# Patient Record
Sex: FEMALE | Race: WHITE
[De-identification: names, ages, dates, MRNs, and addresses within clinical notes are randomized per-mention and may not be internally consistent; named-entity substitution may affect disease eponyms.]

---

## 2017-06-06 ENCOUNTER — HOSPITAL ENCOUNTER (OUTPATIENT)
Dept: HOSPITAL 45 - C.RAD1850 | Age: 52
Discharge: HOME | End: 2017-06-06
Attending: INTERNAL MEDICINE
Payer: COMMERCIAL

## 2017-06-06 DIAGNOSIS — Z79.899: ICD-10-CM

## 2017-06-06 DIAGNOSIS — M45.9: ICD-10-CM

## 2017-06-06 DIAGNOSIS — K21.9: Primary | ICD-10-CM

## 2017-06-06 DIAGNOSIS — M06.9: ICD-10-CM

## 2017-06-06 LAB
ALP SERPL-CCNC: 54 U/L (ref 45–117)
ALT SERPL-CCNC: 35 U/L (ref 12–78)
AST SERPL-CCNC: 20 U/L (ref 15–37)
BASOPHILS # BLD: 0.03 K/UL (ref 0–0.2)
BASOPHILS NFR BLD: 0.4 %
COMPLETE: YES
CREAT SERPL-MCNC: 0.98 MG/DL (ref 0.6–1.2)
EOSINOPHIL NFR BLD AUTO: 365 K/UL (ref 130–400)
HCT VFR BLD CALC: 42.4 % (ref 37–47)
IG%: 0.3 %
IMM GRANULOCYTES NFR BLD AUTO: 27 %
LYMPHOCYTES # BLD: 2.02 K/UL (ref 1.2–3.4)
MCH RBC QN AUTO: 29.8 PG (ref 25–34)
MCHC RBC AUTO-ENTMCNC: 32.8 G/DL (ref 32–36)
MCV RBC AUTO: 91 FL (ref 80–100)
MONOCYTES NFR BLD: 6.4 %
NEUTROPHILS # BLD AUTO: 2.4 %
NEUTROPHILS NFR BLD AUTO: 63.5 %
PMV BLD AUTO: 9.4 FL (ref 7.4–10.4)
RBC # BLD AUTO: 4.66 M/UL (ref 4.2–5.4)
RHEUMATOID FACT FLD-ACNC: 106 U/ML (ref 0–15)
WBC # BLD AUTO: 7.48 K/UL (ref 4.8–10.8)

## 2017-06-06 NOTE — DIAGNOSTIC IMAGING REPORT
LEFT HAND MIN 3 VIEWS ROUTINE



CLINICAL HISTORY: M06.9 Rheumatoid [id number] Ankylosing [id number]

Hi    



COMPARISON: None.



DISCUSSION: Mild degenerative change of the interphalangeal as well as

metacarpal phalangeal joints throughout the hand. Mild periarticular osteopenia.

Several subtle marginal erosions. This is most prominent at the level of the

distal second metacarpal. There is no evidence for soft tissue swelling.



IMPRESSION: Findings consistent with early and/or developing rheumatoid change.







Electronically signed by:  Blake Eugene M.D.

6/6/2017 10:08 AM



Dictated Date/Time:  6/6/2017 10:06 AM

## 2017-06-06 NOTE — DIAGNOSTIC IMAGING REPORT
LEFT HAND MIN 3 VIEWS ROUTINE



CLINICAL HISTORY: Rheumatoid arthritis. Ankylosing spondylitis. Left hand pain. 

   



COMPARISON: None.



DISCUSSION: No acute fractures are visualized. There are mild osteoarthritic

type changes most pronounced at the level of the proximal interphalangeal joint

of the index finger, and first metacarpal phalangeal joint. There is equivocal

subchondral cyst within the distal radius.    



IMPRESSION: 

1. No acute fractures

2. Mild degenerative changes

3. No evidence of erosive disease







Electronically signed by:  Lio Francisco M.D.

6/6/2017 10:09 AM



Dictated Date/Time:  6/6/2017 10:07 AM

## 2017-07-11 ENCOUNTER — HOSPITAL ENCOUNTER (OUTPATIENT)
Dept: HOSPITAL 45 - C.RAD1850 | Age: 52
Discharge: HOME | End: 2017-07-11
Attending: INTERNAL MEDICINE
Payer: COMMERCIAL

## 2017-07-11 DIAGNOSIS — M25.461: ICD-10-CM
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
## 2017-07-11 DIAGNOSIS — E55.9: Primary | ICD-10-CM

## 2017-07-11 DIAGNOSIS — Z79.899: ICD-10-CM

## 2017-07-11 DIAGNOSIS — M06.9: ICD-10-CM

## 2017-07-11 DIAGNOSIS — M25.561: ICD-10-CM

## 2017-07-11 DIAGNOSIS — S62.101A: ICD-10-CM

## 2017-07-11 NOTE — DIAGNOSTIC IMAGING REPORT
RIGHT KNEE 3 VIEWS



HISTORY:52 [id number] Rheumatoid uvzlqnsjoK34.899 High risk medication

useM25.56

Right 



COMPARISON: None available.



TECHNIQUE: Frontal, lateral and sunrise views of the right knee.



FINDINGS: 

There is a moderate-sized joint effusion. There is only minimal tricompartmental

marginal spurring without erosive changes identified. No acute fracture or

dislocation. Negative for radiopaque foreign body.



IMPRESSION: 

1. Moderate sized joint effusion.

2. Minimal tricompartmental marginal spurring without evidence of erosive

arthropathy. 







The above report was generated using voice recognition software. It may contain

grammatical, syntax or spelling errors.







Electronically signed by:  Claudio Flores

7/11/2017 10:57 AM



Dictated Date/Time:  7/11/2017 10:55 AM

## 2017-07-18 ENCOUNTER — HOSPITAL ENCOUNTER (OUTPATIENT)
Dept: HOSPITAL 45 - C.MAMM | Age: 52
Discharge: HOME | End: 2017-07-18
Attending: INTERNAL MEDICINE
Payer: COMMERCIAL

## 2017-07-18 DIAGNOSIS — Z79.899: Primary | ICD-10-CM
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
## 2017-07-18 DIAGNOSIS — S62.101A: ICD-10-CM

## 2017-07-18 DIAGNOSIS — M06.9: ICD-10-CM

## 2017-07-18 DIAGNOSIS — M25.561: ICD-10-CM

## 2017-07-18 DIAGNOSIS — S62.609A: ICD-10-CM

## 2017-07-18 DIAGNOSIS — E55.9: ICD-10-CM

## 2017-07-24 NOTE — CODING QUERY MEDICAL NECESSITY
CQSUPPORTING DIAGNOSIS NEEDED





A supporting diagnosis is required for the test/procedure performed on this patient in 
order for us to be reimbursed by the patient's insurance. Please provide a supporting 
diagnosis for the following test/procedure listed below next to the test name along with 
your signature. 



*If there is no additional diagnosis for this patient that would support the following 
test/procedure please document that below next to the test/procedure.



Test(s)/Procedure(s) that require a supporting diagnosis:





DOS   07/18/17    BONE  MINERAL DENSITY STUDY







Provider Signature:  ______________________________  Date:  _______



Thank you  

Tiffanie Sheth

Health Information Management

Phone:  [phone]

Fax:  [phone]



Once completed, please kindly fax back to [phone]



For questions please call [phone]

## 2017-10-06 ENCOUNTER — HOSPITAL ENCOUNTER (OUTPATIENT)
Dept: HOSPITAL 45 - C.LAB1850 | Age: 52
Discharge: HOME | End: 2017-10-06
Attending: INTERNAL MEDICINE
Payer: COMMERCIAL

## 2017-10-06 DIAGNOSIS — Z79.899: ICD-10-CM

## 2017-10-06 DIAGNOSIS — M25.539: ICD-10-CM

## 2017-10-06 DIAGNOSIS — E55.9: ICD-10-CM

## 2017-10-06 DIAGNOSIS — Z51.81: Primary | ICD-10-CM

## 2017-10-06 LAB
ALP SERPL-CCNC: 70 U/L (ref 45–117)
ALT SERPL-CCNC: 52 U/L (ref 12–78)
AST SERPL-CCNC: 23 U/L (ref 15–37)
BASOPHILS # BLD: 0.05 K/UL (ref 0–0.2)
BASOPHILS NFR BLD: 0.6 %
COMPLETE: YES
CREAT SERPL-MCNC: 0.82 MG/DL (ref 0.6–1.2)
EOSINOPHIL NFR BLD AUTO: 372 K/UL (ref 130–400)
HCT VFR BLD CALC: 41.7 % (ref 37–47)
IG%: 0.4 %
IMM GRANULOCYTES NFR BLD AUTO: 24 %
LYMPHOCYTES # BLD: 1.86 K/UL (ref 1.2–3.4)
MCH RBC QN AUTO: 30.2 PG (ref 25–34)
MCHC RBC AUTO-ENTMCNC: 33.6 G/DL (ref 32–36)
MCV RBC AUTO: 89.9 FL (ref 80–100)
MONOCYTES NFR BLD: 9.4 %
NEUTROPHILS # BLD AUTO: 2.8 %
NEUTROPHILS NFR BLD AUTO: 62.8 %
PMV BLD AUTO: 10.2 FL (ref 7.4–10.4)
RBC # BLD AUTO: 4.64 M/UL (ref 4.2–5.4)
WBC # BLD AUTO: 7.76 K/UL (ref 4.8–10.8)

## 2018-03-29 ENCOUNTER — HOSPITAL ENCOUNTER (OUTPATIENT)
Dept: HOSPITAL 45 - C.LAB1850 | Age: 53
Discharge: HOME | End: 2018-03-29
Attending: INTERNAL MEDICINE
Payer: COMMERCIAL

## 2018-03-29 DIAGNOSIS — M06.9: ICD-10-CM

## 2018-03-29 DIAGNOSIS — E55.9: Primary | ICD-10-CM

## 2018-03-29 DIAGNOSIS — M25.561: ICD-10-CM

## 2018-03-29 DIAGNOSIS — Z79.899: ICD-10-CM

## 2018-03-29 DIAGNOSIS — M45.9: ICD-10-CM

## 2018-03-29 LAB
ALBUMIN SERPL-MCNC: 3.7 GM/DL (ref 3.4–5)
ALP SERPL-CCNC: 62 U/L (ref 45–117)
ALT SERPL-CCNC: 45 U/L (ref 12–78)
AST SERPL-CCNC: 23 U/L (ref 15–37)
BASOPHILS # BLD: 0.03 K/UL (ref 0–0.2)
BASOPHILS NFR BLD: 0.4 %
EOS ABS #: 0.17 K/UL (ref 0–0.5)
EOSINOPHIL NFR BLD AUTO: 364 K/UL (ref 130–400)
HCT VFR BLD CALC: 41.1 % (ref 37–47)
HGB BLD-MCNC: 13.6 G/DL (ref 12–16)
IG#: 0.01 K/UL (ref 0–0.02)
IMM GRANULOCYTES NFR BLD AUTO: 31.9 %
LYMPHOCYTES # BLD: 2.19 K/UL (ref 1.2–3.4)
MCH RBC QN AUTO: 30 PG (ref 25–34)
MCHC RBC AUTO-ENTMCNC: 33.1 G/DL (ref 32–36)
MCV RBC AUTO: 90.7 FL (ref 80–100)
MONO ABS #: 0.66 K/UL (ref 0.11–0.59)
MONOCYTES NFR BLD: 9.6 %
NEUT ABS #: 3.8 K/UL (ref 1.4–6.5)
NEUTROPHILS # BLD AUTO: 2.5 %
NEUTROPHILS NFR BLD AUTO: 55.5 %
PMV BLD AUTO: 10.1 FL (ref 7.4–10.4)
PROT SERPL-MCNC: 7.4 GM/DL (ref 6.4–8.2)
RED CELL DISTRIBUTION WIDTH CV: 13.8 % (ref 11.5–14.5)
RED CELL DISTRIBUTION WIDTH SD: 45.3 FL (ref 36.4–46.3)
WBC # BLD AUTO: 6.86 K/UL (ref 4.8–10.8)

## 2018-07-30 ENCOUNTER — HOSPITAL ENCOUNTER (OUTPATIENT)
Dept: HOSPITAL 45 - C.RAD1850 | Age: 53
Discharge: HOME | End: 2018-07-30
Attending: INTERNAL MEDICINE
Payer: COMMERCIAL

## 2018-07-30 DIAGNOSIS — M06.9: ICD-10-CM

## 2018-07-30 DIAGNOSIS — M25.571: ICD-10-CM

## 2018-07-30 DIAGNOSIS — K21.9: Primary | ICD-10-CM

## 2018-07-30 DIAGNOSIS — M25.539: ICD-10-CM

## 2018-07-30 DIAGNOSIS — M45.9: ICD-10-CM

## 2018-07-30 DIAGNOSIS — Z79.899: ICD-10-CM

## 2018-07-30 NOTE — DIAGNOSTIC IMAGING REPORT
L ANKLE MIN 3 VIEWS ROUTINE



CLINICAL HISTORY: M06.9 Rheumatoid tbmtofubeP07.9 Ankylosing nxnkxyokozqW17.899

Hi    



COMPARISON: None.



DISCUSSION: Mild soft tissue edema over both the medial as well as lateral

malleolus. No acute bony abnormality. Subtalar joint is intact. Cortical margins

are intact.    



IMPRESSION: Soft tissue edema. No acute bony abnormality.











The above report was generated using voice recognition software.  It may contain

grammatical, syntax or spelling errors.







Electronically signed by:  Blake Eugene M.D.

7/30/2018 10:36 AM



Dictated Date/Time:  7/30/2018 10:35 AM

## 2018-07-30 NOTE — DIAGNOSTIC IMAGING REPORT
R WRIST MIN 3 VIEWS ROUTINE



HISTORY:  53 years-old Female M06.9 Rheumatoid pbratlircN11.9 Ankylosing

warbluxgtslV31.899 Hi chronic right wrist pain with history of rheumatoid

arthritis



COMPARISON: Right hand radiographs of same day



TECHNIQUE: 4 views of the right wrist



FINDINGS: 

Mild radiocarpal and first carpometacarpal joint space narrowing with

subchondral sclerosis compatible with osteoarthritis. Mild periarticular

osteopenia. Osteophytosis about the metacarpal heads, notably within the second

and third digits. No acute fracture, dislocation or evidence of erosive

arthropathy. Mild soft tissue prominence about the dorsal wrist. No opaque

foreign body.



IMPRESSION: 

1. Mild dorsal wrist soft tissue swelling without acute fracture, dislocation or

evidence of erosive arthropathy.

2. Mild periarticular osteopenia with degenerative changes as above. 







The above report was generated using voice recognition software. It may contain

grammatical, syntax or spelling errors.







Electronically signed by:  Claudio Flores M.D.

7/30/2018 10:42 AM



Dictated Date/Time:  7/30/2018 10:37 AM

## 2018-07-30 NOTE — DIAGNOSTIC IMAGING REPORT
R HAND MIN 3 VIEWS ROUTINE



CLINICAL HISTORY: M06.9 Rheumatoid lwkdsfpztR74.9 Ankylosing ritytewfvkkA78.899

Hi pain



COMPARISON: 6/6/2017



DISCUSSION: Minimal degenerative change of the interphalangeal as well as

metacarpophalangeal joints. No significant marginal erosions. No significant

periarticular osteopenia. There is no evidence for soft tissue swelling.



IMPRESSION: Minimal/mild degenerative change of the interphalangeal and

metacarpophalangeal joints. Otherwise negative study.











The above report was generated using voice recognition software.  It may contain

grammatical, syntax or spelling errors.







Electronically signed by:  Blake Eugene M.D.

7/30/2018 10:38 AM



Dictated Date/Time:  7/30/2018 10:38 AM